# Patient Record
Sex: FEMALE | Employment: FULL TIME | URBAN - NONMETROPOLITAN AREA
[De-identification: names, ages, dates, MRNs, and addresses within clinical notes are randomized per-mention and may not be internally consistent; named-entity substitution may affect disease eponyms.]

---

## 2017-05-29 ENCOUNTER — OFFICE VISIT (OUTPATIENT)
Dept: URGENT CARE | Facility: PHYSICIAN GROUP | Age: 27
End: 2017-05-29
Payer: COMMERCIAL

## 2017-05-29 VITALS
BODY MASS INDEX: 24.9 KG/M2 | HEART RATE: 80 BPM | DIASTOLIC BLOOD PRESSURE: 84 MMHG | OXYGEN SATURATION: 96 % | TEMPERATURE: 97.6 F | WEIGHT: 159 LBS | RESPIRATION RATE: 14 BRPM | SYSTOLIC BLOOD PRESSURE: 122 MMHG

## 2017-05-29 DIAGNOSIS — J01.00 ACUTE MAXILLARY SINUSITIS, RECURRENCE NOT SPECIFIED: ICD-10-CM

## 2017-05-29 DIAGNOSIS — H92.01 ACUTE OTALGIA, RIGHT: ICD-10-CM

## 2017-05-29 PROCEDURE — 99214 OFFICE O/P EST MOD 30 MIN: CPT | Performed by: NURSE PRACTITIONER

## 2017-05-29 RX ORDER — FLUTICASONE PROPIONATE 50 MCG
1 SPRAY, SUSPENSION (ML) NASAL 2 TIMES DAILY
Qty: 16 G | Refills: 0 | Status: SHIPPED | OUTPATIENT
Start: 2017-05-29 | End: 2017-09-02

## 2017-05-29 RX ORDER — AZITHROMYCIN 250 MG/1
TABLET, FILM COATED ORAL
Qty: 6 TAB | Refills: 0 | Status: SHIPPED | OUTPATIENT
Start: 2017-05-29 | End: 2017-09-02

## 2017-05-29 NOTE — MR AVS SNAPSHOT
Nette Sinha   2017 10:10 AM   Office Visit   MRN: [id number]    Department:  [facility]   Dept Phone:  [phone]    Description:  Female : 1990   Provider:  CARMINA Green           Reason for Visit     Otalgia           Allergies as of 2017     Allergen Noted Reactions    Penicillins 2014   Swelling      You were diagnosed with     Acute maxillary sinusitis, recurrence not specified   [[id number]]       Acute otalgia, right   [[id number]]         Vital Signs     Blood Pressure Pulse Temperature Respirations Weight Oxygen Saturation    122/84 mmHg 80 36.4 °C (97.6 °F) 14 72.122 kg (159 lb) 96%    Smoking Status                   Never Smoker            Basic Information     Date Of Birth Sex Race Ethnicity Preferred Language    1990 Female Unknown Unknown English      Health Maintenance        Date Due Completion Dates    IMM HEP B VACCINE (1 of 3 - Primary Series) 1989 ---    IMM HEP A VACCINE (1 of 2 - Standard Series) 1990 ---    IMM VARICELLA (CHICKENPOX) VACCINE (1 of 2 - 2 Dose Adolescent Series) 2002 ---    IMM DTaP/Tdap/Td Vaccine (1 - Tdap) 2008 ---    PAP SMEAR 2010 ---            Current Immunizations     Tuberculin Skin Test 3/12/2012 12:26 PM, 3/5/2012 11:50 AM      Below and/or attached are the medications your provider expects you to take. Review all of your home medications and newly ordered medications with your provider and/or pharmacist. Follow medication instructions as directed by your provider and/or pharmacist. Please keep your medication list with you and share with your provider. Update the information when medications are discontinued, doses are changed, or new medications (including over-the-counter products) are added; and carry medication information at all times in the event of emergency situations     Allergies:  PENICILLINS - Swelling               Medications  Valid as of: May 29, 2017 - 10:49 AM    Generic  Name Brand Name Tablet Size Instructions for use    Azithromycin (Tab) ZITHROMAX 250 MG Take two tabs on day one followed by one tab on days 2-5.        Fluticasone Propionate (Suspension) FLONASE 50 MCG/ACT Spray 1 Spray in nose 2 times a day.        .                 Medicines prescribed today were sent to:     [id number] - [place], [place] - [address] [facility] [address] [facility] [address]    Phone: [phone] Fax: [phone]    Open 24 Hours?: No      Medication refill instructions:       If your prescription bottle indicates you have medication refills left, it is not necessary to call your provider’s office. Please contact your pharmacy and they will refill your medication.    If your prescription bottle indicates you do not have any refills left, you may request refills at any time through one of the following ways: The online Barkibu system (except Urgent Care), by calling your provider’s office, or by asking your pharmacy to contact your provider’s office with a refill request. Medication refills are processed only during regular business hours and may not be available until the next business day. Your provider may request additional information or to have a follow-up visit with you prior to refilling your medication.   *Please Note: Medication refills are assigned a new Rx number when refilled electronically. Your pharmacy may indicate that no refills were authorized even though a new prescription for the same medication is available at the pharmacy. Please request the medicine by name with the pharmacy before contacting your provider for a refill.           Barkibu Access Code: [email or web address]  Expires: 6/28/2017 10:49 AM    Your email address is not on file at [facility].  Email Addresses are required for you to sign up for Barkibu, please contact [phone] to verify your personal information and to provide your email address prior to attempting to  register for [employer].    [facility]  [address] [place] [address]    Recombinehart  A secure, online tool to manage your health information     [employer]® is a secure, online tool that connects you to your personalized health information from the privacy of your home -- day or night - making it very easy for you to manage your healthcare. Once the activation process is completed, you can even access your medical information using the [employer] refugio, which is available for free in the Apple Refugio store or Google Play store.     To learn more about [employer], visit [email or web address]    There are two levels of access available (as shown below):   My Chart Features  [facility] Primary Care Doctor [facility]  Specialists [facility]  Urgent  Care Non-[facility] Primary Care Doctor   Email your healthcare team securely and privately 24/7 X X X    Manage appointments: schedule your next appointment; view details of past/upcoming appointments X      Request prescription refills. X      View recent personal medical records, including lab and immunizations X X X X   View health record, including health history, allergies, medications X X X X   Read reports about your outpatient visits, procedures, consult and ER notes X X X X   See your discharge summary, which is a recap of your hospital and/or ER visit that includes your diagnosis, lab results, and care plan X X  X     How to register for [employer]:  Once your e-mail address has been verified, follow the following steps to sign up for [employer].     1. Go to  [email or web address]  2. Click on the Sign Up Now box, which takes you to the New Member Sign Up page. You will need to provide the following information:  a. Enter your [employer] Access Code exactly as it appears at the top of this page. (You will not need to use this code after you’ve completed the sign-up process. If you do not sign up before the expiration date, you must request a new code.)   b. Enter your date of  birth.   c. Enter your home email address.   d. Click Submit, and follow the next screen’s instructions.  3. Create a [employer] ID. This will be your [employer] login ID and cannot be changed, so think of one that is secure and easy to remember.  4. Create a Slanissuet password. You can change your password at any time.  5. Enter your Password Reset Question and Answer. This can be used at a later time if you forget your password.   6. Enter your e-mail address. This allows you to receive e-mail notifications when new information is available in [employer].  7. Click Sign Up. You can now view your health information.    For assistance activating your [employer] account, call [phone]

## 2017-05-29 NOTE — PROGRESS NOTES
Chief Complaint   Patient presents with   • Otalgia       HISTORY OF PRESENT ILLNESS: Patient is a 26 y.o. female who presents today due complaints of right ear pain for the past two days. She admits to one week of associated nasal congestion and sinus pressure. She denies associated cough, difficulty breathing, confusion, nausea, vomiting or diarrhea. She has tried OTC cold/sinus medication at home without much improvement. She admits to a history of seasonal allergies in the past and feels her symptoms may be related. No known ill contacts at home. No recent antibiotic usage.     There are no active problems to display for this patient.      Allergies:Penicillins    Current Outpatient Prescriptions Ordered in [facility]   Medication Sig Dispense Refill   • azithromycin (ZITHROMAX) 250 MG Tab Take two tabs on day one followed by one tab on days 2-5. 6 Tab 0   • fluticasone (FLONASE) 50 MCG/ACT nasal spray Spray 1 Spray in nose 2 times a day. 16 g 0     No current Epic-ordered facility-administered medications on file.       History reviewed. No pertinent past medical history.    Social History   Substance Use Topics   • Smoking status: Never Smoker    • Smokeless tobacco: Never Used   • Alcohol Use: Yes       No family status information on file.   History reviewed. No pertinent family history.    ROS:  Review of Systems   Constitutional: Negative for fever, chills, fatigue.   HENT: Positive for right ear pain, maxillary sinus pressure, nasal congestion. Negative for sore throat, nosebleeds, neck pain.    Eyes: Negative for vision changes.   Cardiovascular: Negative for chest pain, palpitations, orthopnea and leg swelling.   Respiratory: Negative for cough, sputum production, shortness of breath and wheezing.   Gastrointestinal: Negative for abdominal pain, nausea, vomiting or diarrhea.    Skin: Negative for rash, diaphoresis.     Exam:  Blood pressure 122/84, pulse 80, temperature 36.4 °C (97.6 °F), resp. rate 14,  weight 72.122 kg (159 lb), SpO2 96 %.  General: well-nourished, well-developed female in NAD  Head: normocephalic, atraumatic  Eyes: PERRLA, no conjunctival injection, acuity grossly intact, lids normal.  Ears: normal shape and symmetry, no tenderness, no discharge. External canals are without any significant edema or erythema. Tympanic membranes are without any inflammation, no effusion. Gross auditory acuity is intact.  Nose: symmetrical without tenderness, erythema and swelling noted bilateral turbinates, clear discharge. Mild bilateral sinus tenderness.   Mouth/Throat: reasonable hygiene, no exudates or tonsillar enlargement. Erythema is present.   Neck: no masses, range of motion within normal limits, no tracheal deviation. No obvious thyroid enlargement.   Lymph: no cervical adenopathy. No supraclavicular adenopathy.   Neuro: alert and oriented. Cranial nerves 1-12 grossly intact. No sensory deficit.   Cardiovascular: regular rate and rhythm. No edema.  Pulmonary: no distress. Chest is symmetrical with respiration, no wheezes, crackles, or rhonchi.   Musculoskeletal: no clubbing, appropriate muscle tone, gait is stable.  Skin: warm, dry, intact, no clubbing, no cyanosis, no rashes.   Psych: appropriate mood, affect, judgement.         Assessment/Plan:  1. Acute maxillary sinusitis, recurrence not specified  azithromycin (ZITHROMAX) 250 MG Tab    fluticasone (FLONASE) 50 MCG/ACT nasal spray   2. Acute otalgia, right  fluticasone (FLONASE) 50 MCG/ACT nasal spray         Otalgia most likely related to sinusitis, supportive care discussed.   Flonase as directed. Nasal washes with sterile saline solution daily. Sleep with HOB elevated, humidifier at night, rest, increase fluid intake.   Contingent antibiotic prescription given to patient to fill upon meeting criteria of guidelines discussed.   Differential diagnoses, and indications for immediate follow-up discussed with patient.   Pathogenesis of diagnosis  discussed including typical length and natural progression.   Instructed to return to clinic or nearest emergency department for any change in condition, further concerns, or worsening of symptoms.  Patient states understanding of the plan of care and discharge instructions.  Instructed to make an appointment, for follow up, with their primary care provider.        Please note that this dictation was created using voice recognition software. I have made every reasonable attempt to correct obvious errors, but I expect that there are errors of grammar and possibly content that I did not discover before finalizing the note.      SHANE Quiroz.

## 2017-09-02 ENCOUNTER — OFFICE VISIT (OUTPATIENT)
Dept: URGENT CARE | Facility: PHYSICIAN GROUP | Age: 27
End: 2017-09-02
Payer: COMMERCIAL

## 2017-09-02 VITALS
TEMPERATURE: 97.8 F | RESPIRATION RATE: 16 BRPM | HEART RATE: 75 BPM | DIASTOLIC BLOOD PRESSURE: 90 MMHG | WEIGHT: 158 LBS | OXYGEN SATURATION: 96 % | SYSTOLIC BLOOD PRESSURE: 122 MMHG | BODY MASS INDEX: 24.8 KG/M2 | HEIGHT: 67 IN

## 2017-09-02 DIAGNOSIS — L01.00 IMPETIGO: ICD-10-CM

## 2017-09-02 PROCEDURE — 99214 OFFICE O/P EST MOD 30 MIN: CPT | Performed by: NURSE PRACTITIONER

## 2017-09-02 RX ORDER — MUPIROCIN CALCIUM 20 MG/G
1 CREAM TOPICAL 2 TIMES DAILY
Qty: 1 TUBE | Refills: 0 | Status: SHIPPED | OUTPATIENT
Start: 2017-09-02 | End: 2017-09-12

## 2017-09-02 NOTE — PATIENT INSTRUCTIONS
Impetigo, Adult  Impetigo is an infection of the skin. It commonly occurs in young children, but it can also occur in adults. The infection causes itchy blisters and sores that produce brownish-yellow fluid. As the fluid dries, it forms a thick, honey-colored crust. These skin changes usually occur on the face but can also affect other areas of the body. Impetigo usually goes away in 7-10 days with treatment.  CAUSES  Impetigo is caused by two types of bacteria. It may be caused by staphylococci or streptococci bacteria. These bacteria cause impetigo when they get under the surface of the skin. This often happens after some damage to the skin, such as damage from:  · Cuts, scrapes, or scratches.  · Insect bites, especially when you scratch the area of a bite.  · Chickenpox or other illnesses that cause open skin sores.  · Nail biting or chewing.  Impetigo is contagious and can spread easily from one person to another. This may occur through close skin contact or by sharing towels, clothing, or other items with a person who has the infection.  RISK FACTORS  Some things that can increase the risk of getting this infection include:  · Playing sports that include skin-to-skin contact with others.  · Having a skin condition with open sores.  · Having many skin cuts or scrapes.  · Living in an area that has high humidity levels.  · Having poor hygiene.  · Having high levels of staphylococci in your nose.  SIGNS AND SYMPTOMS  Impetigo usually starts out as small blisters, often on the face. The blisters then break open and turn into tiny sores (lesions) with a yellow crust. In some cases, the blisters cause itching or burning. With scratching, irritation, or lack of treatment, these small lesions may get larger. Scratching can also cause impetigo to spread to other parts of the body. The bacteria can get under the fingernails and spread when you touch another area of your skin.  Other possible symptoms include:  · Larger  blisters.  · Pus.  · Swollen lymph glands.  DIAGNOSIS  This condition is usually diagnosed during a physical exam. A skin sample or sample of fluid from a blister may be taken for lab tests that involve growing bacteria (culture test). This can help confirm the diagnosis or help determine the best treatment.  TREATMENT  Mild impetigo can be treated with prescription antibiotic cream. Oral antibiotic medicine may be used in more severe cases. Medicines for itching may also be used.  HOME CARE INSTRUCTIONS  · Take medicines only as directed by your health care provider.  · To help prevent impetigo from spreading to other body areas:  ¨ Keep your fingernails short and clean.  ¨ Do not scratch the blisters or sores.  ¨ Cover infected areas, if necessary, to keep from scratching.  · Gently wash the infected areas with antibiotic soap and water.  · Soak crusted areas in warm, soapy water using antibiotic soap.  ¨ Gently rub the areas to remove crusts. Do not scrub.  · Wash your hands often to avoid spreading this infection.  · Stay home until you have used an antibiotic cream for 48 hours (2 days) or an oral antibiotic medicine for 24 hours (1 day). You should only return to work and activities with other people if your skin shows significant improvement.  PREVENTION   To keep the infection from spreading:  · Stay home until you have used an antibiotic cream for 48 hours or an oral antibiotic for 24 hours.  · Wash your hands often.  · Do not engage in skin-to-skin contact with other people while you have still have blisters.  · Do not share towels, washcloths, or bedding with others while you have the infection.  SEEK MEDICAL CARE IF:  · You develop more blisters or sores despite treatment.  · Other family members get sores.  · Your skin sores are not improving after 48 hours of treatment.  · You have a fever.  SEEK IMMEDIATE MEDICAL CARE IF:  · You see spreading redness or swelling of the skin around your sores.  · You  see red streaks coming from your sores.  · You develop a sore throat.     This information is not intended to replace advice given to you by your health care provider. Make sure you discuss any questions you have with your health care provider.     Document Released: 01/08/2016 Document Reviewed: 01/08/2016  [employer] Interactive Patient Education ©2016 [employer]    Your medical care was provided today by: MOHINDER Pierre    Thank You for the opportunity to serve you.    You may receive a brief survey in the mail shortly regarding your visit today. Please take a few moments to complete the survey and return it; no postage is necessary. We are working to serve our patient population better, improve customer service and our patients overall experience and your input can help us to accomplish this. We thank you for your help and for the opportunity to serve you today and in the future.     Special Instructions:  Always call 9-1-1 immediately if you develop a life threatening emergency.    Unless told otherwise please take all medications as directed and complete prescription therapies.     Watch for the following signs that require additional evaluation: progressive lethargy or unresponsiveness, localized pain (chest, abdomen), shortness of breath, painful breathing, progressive vomiting with weakness, bloody stools, or new rash.     If you are prescribed pain medication or any other medication that is sedating, do not take medication before or while operating a vehicle or heavy machinery or equipment due to potential side effects such as drowsiness and/or dizziness.

## 2017-09-02 NOTE — PROGRESS NOTES
Subjective:     Nette Sinha is a 27 y.o. female here today for new onset rash/sores on her nose.     No problem-specific Assessment & Plan notes found for this encounter.     This is a new problem.   Duration of rash 1-2 days, on nose     Description of rash:    Location: nose, nasal        Symptoms: crusted sores    Is it spreading: minimally, only on nose     Associated Symptoms:   Fatigue: No    Joint or Body Aches: No    Shortness of Breath:No    Wheezing:  No     Pt has a similar rash in the past. She does report a history of impetigo previously and also cold sores. She has not been picking at sores, however, they appears as though she has.   Pt suspects the following as a possible cause of rash: Patient reports her children recently contracted HFM.   New Medications: none  Treatment prior to visit: hydrogen peroxide   Occupation: lab tech     Current medicines (including changes today)  Current Outpatient Prescriptions   Medication Sig Dispense Refill   • mupirocin calcium (BACTROBAN) 2 % Cream Apply 1 Application to affected area(s) 2 times a day for 10 days. 1 Tube 0     No current facility-administered medications for this visit.        She  has a past medical history of Allergy.    She  has a past surgical history that includes dental extraction(s).    Social History     Social History   • Marital status: Single     Spouse name: N/A   • Number of children: N/A   • Years of education: N/A     Social History Main Topics   • Smoking status: Never Smoker   • Smokeless tobacco: Never Used   • Alcohol use Yes      Comment: rare   • Drug use: No   • Sexual activity: Not on file     Other Topics Concern   • Not on file     Social History Narrative   • No narrative on file       Family History   Problem Relation Age of Onset   • Diabetes Mother      Type 1   • Hypertension Father          ROS  Positive for rash on nose.   No fever, No URI symptoms,  no chest pain, no shortness of breath, no abdominal pain.  "    All other systems reviewed and are negative.        Objective:     Blood pressure 122/90, pulse 75, temperature 36.6 °C (97.8 °F), resp. rate 16, height 1.702 m (5' 7\"), weight 71.7 kg (158 lb), SpO2 96 %. Body mass index is 24.75 kg/m².    Physical Exam:   Constitutional: Alert, no distress. Non-toxic appearing.   Eye: Equal, round and reactive, conjunctiva clear, lids normal.   ENMT: Lips without lesions, good dentition, oropharynx clear. TMs normal, without erythema, canals patent. There are two crusted lesions on nose near nasal cavity.  No nasal drainage noted.   Neck: Trachea midline, no masses. No cervical or supraclavicular lymphadenopathy  Respiratory: Unlabored respiratory effort, lungs clear to auscultation, no wheezes, no ronchi.  Cardiovascular: Normal S1, S2, no murmur, no edema.   Psych: Alert and oriented x3, normal affect and mood.      Assessment and Plan:   The following treatment plan was discussed    1. Impetigo  Does have mild appearance of impetigo. Exam otherwise normal, not entirely suspicious of HFM. Per patient, she has a history of cold sores, however, did not feel any tingling previously. Treatment with Bactroban cream. Advised good hand washing, mask while at work. Discussed s/s to seek emergent care. RTC if symptoms worsen or do not resolve.     Reviewed indication, dosage, usage and potential adverse effects of prescribed medications. Patient appears to understand, verbalizes understanding and is willing to try medications as prescribed.      Reviewed risks and benefits of treatment plan. Patient verbally agrees to plan of care.       Followup: Return if symptoms worsen or fail to improve.    SHANE Tyler.     PLEASE NOTE: This dictation was created using voice recognition software. I have made every reasonable attempt to correct obvious errors, but I expect that there may be errors of grammar and possibly content that I did not discover prior finalizing this note. "

## 2017-09-12 ENCOUNTER — RX ONLY (OUTPATIENT)
Age: 27
Setting detail: RX ONLY
End: 2017-09-12

## 2018-05-15 ENCOUNTER — OFFICE VISIT (OUTPATIENT)
Dept: URGENT CARE | Facility: PHYSICIAN GROUP | Age: 28
End: 2018-05-15

## 2018-05-15 VITALS
OXYGEN SATURATION: 96 % | DIASTOLIC BLOOD PRESSURE: 72 MMHG | BODY MASS INDEX: 24.8 KG/M2 | HEART RATE: 106 BPM | TEMPERATURE: 97.5 F | WEIGHT: 158 LBS | SYSTOLIC BLOOD PRESSURE: 120 MMHG | HEIGHT: 67 IN

## 2018-05-15 DIAGNOSIS — J02.9 SORE THROAT: ICD-10-CM

## 2018-05-15 DIAGNOSIS — J02.0 STREP PHARYNGITIS: Primary | ICD-10-CM

## 2018-05-15 LAB
INT CON NEG: NORMAL
INT CON POS: NORMAL
S PYO AG THROAT QL: NORMAL

## 2018-05-15 PROCEDURE — 99214 OFFICE O/P EST MOD 30 MIN: CPT | Performed by: PHYSICIAN ASSISTANT

## 2018-05-15 PROCEDURE — 87880 STREP A ASSAY W/OPTIC: CPT | Performed by: PHYSICIAN ASSISTANT

## 2018-05-15 RX ORDER — AZITHROMYCIN 250 MG/1
TABLET, FILM COATED ORAL
Qty: 6 TAB | Refills: 0 | Status: SHIPPED | OUTPATIENT
Start: 2018-05-15 | End: 2018-12-23

## 2018-05-15 NOTE — PROGRESS NOTES
"Chief Complaint   Patient presents with   • Pharyngitis     Sore throat/daughter had strep x2 weeks ago        HISTORY OF PRESENT ILLNESS: Patient is a 27 y.o. female who presents today because she has a 2-3 day history of worsening sore throat. Reports fevers, chills, no nausea, vomiting or diarrhea. She has been taking over-the-counter anti-inflammatories for her symptoms without any significant improvement.    There are no active problems to display for this patient.      Allergies:Penicillins    Current Outpatient Prescriptions Ordered in [facility]   Medication Sig Dispense Refill   • azithromycin (ZITHROMAX) 250 MG Tab Follow package directions 6 Tab 0     No current Epic-ordered facility-administered medications on file.        Past Medical History:   Diagnosis Date   • Allergy        Social History   Substance Use Topics   • Smoking status: Never Smoker   • Smokeless tobacco: Never Used   • Alcohol use Yes      Comment: rare       Family Status   Relation Status   • Mother Alive   • Father Alive     Family History   Problem Relation Age of Onset   • Diabetes Mother      Type 1   • Hypertension Father        ROS:  Review of Systems   Constitutional: Positive for fever, chills, no weight loss and malaise/fatigue.   HENT: Negative for ear pain, nosebleeds, congestion, positive for sore throat and neck pain.    Eyes: Negative for blurred vision.   Respiratory: Negative for cough, sputum production, shortness of breath and wheezing.    Cardiovascular: Negative for chest pain, palpitations, orthopnea and leg swelling.   Gastrointestinal: Negative for heartburn, nausea, vomiting and abdominal pain.   Genitourinary: Negative for dysuria, urgency and frequency.     Exam:  Blood pressure 120/72, pulse (!) 106, temperature 36.4 °C (97.5 °F), height 1.702 m (5' 7\"), weight 71.7 kg (158 lb), SpO2 96 %.  General:  Well nourished, well developed female in NAD  Head:Normocephalic, atraumatic  Eyes: PERRLA, EOM within normal " limits, no conjunctival injection, no scleral icterus, visual fields and acuity grossly intact.  Ears: Normal shape and symmetry, no tenderness, no discharge. External canals are without any significant edema or erythema. Tympanic membranes are without any inflammation, no effusion. Gross auditory acuity is intact  Nose: Symmetrical without tenderness, no discharge.  Mouth: reasonable hygiene, she has pharyngeal arch erythema without exudates or tonsillar enlargement.  Neck: There is bilateral anterior cervical lymph node enlargement and tenderness, range of motion within normal limits, no tracheal deviation. No obvious thyroid enlargement.  Pulmonary: chest is symmetrical with respiration, no wheezes, crackles, or rhonchi.  Cardiovascular: regular rate and rhythm without murmurs, rubs, or gallops.  Extremities: no clubbing, cyanosis, or edema.    Strep test is positive    Please note that this dictation was created using voice recognition software. I have made every reasonable attempt to correct obvious errors, but I expect that there are errors of grammar and possibly content that I did not discover before finalizing the note.    Assessment/Plan:  1. Strep pharyngitis  azithromycin (ZITHROMAX) 250 MG Tab   2. Sore throat  POCT Rapid Strep A    , Tylenol, ibuprofen, over-the-counter medications as tolerated.  Necessary    Followup with primary care in the next 7-10 days if not significantly improving, return to the urgent care or go to the emergency room sooner for any worsening of symptoms.

## 2018-12-23 ENCOUNTER — OFFICE VISIT (OUTPATIENT)
Dept: URGENT CARE | Facility: PHYSICIAN GROUP | Age: 28
End: 2018-12-23
Payer: COMMERCIAL

## 2018-12-23 VITALS
HEART RATE: 74 BPM | RESPIRATION RATE: 16 BRPM | OXYGEN SATURATION: 98 % | BODY MASS INDEX: 23.81 KG/M2 | WEIGHT: 152 LBS | TEMPERATURE: 98.4 F | DIASTOLIC BLOOD PRESSURE: 82 MMHG | SYSTOLIC BLOOD PRESSURE: 116 MMHG

## 2018-12-23 DIAGNOSIS — J02.9 PHARYNGITIS, UNSPECIFIED ETIOLOGY: ICD-10-CM

## 2018-12-23 DIAGNOSIS — Z20.818 EXPOSURE TO STREP THROAT: ICD-10-CM

## 2018-12-23 LAB
INT CON NEG: NORMAL
INT CON POS: NORMAL
S PYO AG THROAT QL: NEGATIVE

## 2018-12-23 PROCEDURE — 99214 OFFICE O/P EST MOD 30 MIN: CPT | Performed by: NURSE PRACTITIONER

## 2018-12-23 PROCEDURE — 87880 STREP A ASSAY W/OPTIC: CPT | Performed by: NURSE PRACTITIONER

## 2018-12-23 RX ORDER — AZITHROMYCIN 250 MG/1
TABLET, FILM COATED ORAL
Qty: 6 TAB | Refills: 0 | Status: SHIPPED | OUTPATIENT
Start: 2018-12-23 | End: 2023-02-21

## 2018-12-23 ASSESSMENT — ENCOUNTER SYMPTOMS
MYALGIAS: 0
SHORTNESS OF BREATH: 0
DIZZINESS: 0
SWOLLEN GLANDS: 1
SORE THROAT: 1
FEVER: 1
NAUSEA: 0
HEADACHES: 0
NECK PAIN: 0
TROUBLE SWALLOWING: 0
CHILLS: 1
EYE PAIN: 0
VOMITING: 0
COUGH: 0

## 2018-12-23 NOTE — PROGRESS NOTES
Subjective:   Nette Sinha is a 28 y.o. female who presents for Sore Throat (x1d with low grade fever)        Pharyngitis    This is a new problem. The current episode started yesterday. The problem has been unchanged. Neither side of throat is experiencing more pain than the other. The maximum temperature recorded prior to her arrival was 100.4 - 100.9 F. The fever has been present for less than 1 day. The pain is at a severity of 8/10. The pain is moderate. Associated symptoms include swollen glands. Pertinent negatives include no congestion, coughing, ear discharge, ear pain, headaches, neck pain, shortness of breath, trouble swallowing or vomiting. She has had no exposure to strep. She has tried acetaminophen for the symptoms. The treatment provided no relief.     Review of Systems   Constitutional: Positive for chills and fever.   HENT: Positive for sore throat. Negative for congestion, ear discharge, ear pain and trouble swallowing.    Eyes: Negative for pain.   Respiratory: Negative for cough and shortness of breath.    Cardiovascular: Negative for chest pain.   Gastrointestinal: Negative for nausea and vomiting.   Genitourinary: Negative for hematuria.   Musculoskeletal: Negative for myalgias and neck pain.   Skin: Negative for rash.   Neurological: Negative for dizziness and headaches.     Allergies   Allergen Reactions   • Penicillins Swelling      Objective:   /82   Pulse 74   Temp 36.9 °C (98.4 °F) (Temporal)   Resp 16   Wt 68.9 kg (152 lb)   SpO2 98%   BMI 23.81 kg/m²   Physical Exam   Constitutional: She is oriented to person, place, and time. She appears well-developed and well-nourished. No distress.   HENT:   Head: Normocephalic and atraumatic.   Right Ear: Tympanic membrane normal.   Left Ear: Tympanic membrane normal.   Nose: Nose normal. Right sinus exhibits no maxillary sinus tenderness and no frontal sinus tenderness. Left sinus exhibits no maxillary sinus tenderness and no  frontal sinus tenderness.   Mouth/Throat: Uvula is midline and mucous membranes are normal. No trismus in the jaw. No uvula swelling. Posterior oropharyngeal edema and posterior oropharyngeal erythema present. No tonsillar abscesses. No tonsillar exudate.   Eyes: Pupils are equal, round, and reactive to light. Conjunctivae and EOM are normal. Right eye exhibits no discharge. Left eye exhibits no discharge.   Cardiovascular: Normal rate and regular rhythm.    No murmur heard.  Pulmonary/Chest: Effort normal and breath sounds normal. No respiratory distress.   Abdominal: Soft. She exhibits no distension. There is no tenderness.   Lymphadenopathy:     She has no cervical adenopathy.   Neurological: She is alert and oriented to person, place, and time. She has normal reflexes. No sensory deficit.   Skin: Skin is warm, dry and intact.   Psychiatric: She has a normal mood and affect.         Assessment/Plan:     1. Pharyngitis, unspecified etiology  POCT Rapid Strep A    azithromycin (ZITHROMAX) 250 MG Tab   2. Exposure to strep throat  azithromycin (ZITHROMAX) 250 MG Tab     Strep negative  Patient's daughter who is in exam room with patient positive for strep.    Advised to continue supportive care with Tylenol and/or ibuprofen for fevers and discomfort. Increased fluids and electrolytes.  Patient given precautionary s/sx that mandate immediate follow up and evaluation in the ED. Advised of risks of not doing so.    DDX, Supportive care, and indications for immediate follow-up discussed with patient.    Instructed to return to clinic or nearest emergency department if we are not available for any change in condition, further concerns, or worsening of symptoms.    The patient demonstrated a good understanding and agreed with the treatment plan.

## 2019-03-27 ENCOUNTER — APPOINTMENT (RX ONLY)
Dept: URBAN - NONMETROPOLITAN AREA CLINIC 15 | Facility: CLINIC | Age: 29
Setting detail: DERMATOLOGY
End: 2019-03-27

## 2019-03-27 DIAGNOSIS — L70.0 ACNE VULGARIS: ICD-10-CM

## 2019-03-27 DIAGNOSIS — B00.1 HERPESVIRAL VESICULAR DERMATITIS: ICD-10-CM

## 2019-03-27 PROCEDURE — ? PATIENT SPECIFIC COUNSELING

## 2019-03-27 PROCEDURE — ? COUNSELING

## 2019-03-27 PROCEDURE — ? PRESCRIPTION

## 2019-03-27 PROCEDURE — ? MEDICATION COUNSELING

## 2019-03-27 PROCEDURE — 99213 OFFICE O/P EST LOW 20 MIN: CPT

## 2019-03-27 RX ORDER — TRETINOIN 0.25 MG/G
CREAM TOPICAL QHS
Qty: 1 | Refills: 12 | Status: ERX | COMMUNITY
Start: 2019-03-27

## 2019-03-27 RX ORDER — DAPSONE 75 MG/G
GEL TOPICAL
Qty: 1 | Refills: 12 | Status: ERX | COMMUNITY
Start: 2019-03-27

## 2019-03-27 RX ORDER — VALACYCLOVIR HYDROCHLORIDE 1 G/1
TABLET, FILM COATED ORAL Q12 HOURS
Qty: 30 | Refills: 12 | Status: ERX | COMMUNITY
Start: 2019-03-27

## 2019-03-27 RX ADMIN — TRETINOIN: 0.25 CREAM TOPICAL at 15:50

## 2019-03-27 RX ADMIN — DAPSONE: 75 GEL TOPICAL at 15:49

## 2019-03-27 RX ADMIN — VALACYCLOVIR HYDROCHLORIDE: 1 TABLET, FILM COATED ORAL at 15:52

## 2019-03-27 ASSESSMENT — LOCATION SIMPLE DESCRIPTION DERM
LOCATION SIMPLE: RIGHT LIP
LOCATION SIMPLE: INFERIOR FOREHEAD

## 2019-03-27 ASSESSMENT — LOCATION DETAILED DESCRIPTION DERM
LOCATION DETAILED: RIGHT SUPERIOR VERMILION LIP
LOCATION DETAILED: INFERIOR MID FOREHEAD

## 2019-03-27 ASSESSMENT — LOCATION ZONE DERM
LOCATION ZONE: FACE
LOCATION ZONE: LIP

## 2019-03-27 NOTE — PROCEDURE: COUNSELING
Topical Retinoid Pregnancy And Lactation Text: This medication is Pregnancy Category C. It is unknown if this medication is excreted in breast milk.
Erythromycin Pregnancy And Lactation Text: This medication is Pregnancy Category B and is considered safe during pregnancy. It is also excreted in breast milk.
Dapsone Counseling: I discussed with the patient the risks of dapsone including but not limited to hemolytic anemia, agranulocytosis, rashes, methemoglobinemia, kidney failure, peripheral neuropathy, headaches, GI upset, and liver toxicity.  Patients who start dapsone require monitoring including baseline LFTs and weekly CBCs for the first month, then every month thereafter.  The patient verbalized understanding of the proper use and possible adverse effects of dapsone.  All of the patient's questions and concerns were addressed.
Minocycline Counseling: Patient advised regarding possible photosensitivity and discoloration of the teeth, skin, lips, tongue and gums.  Patient instructed to avoid sunlight, if possible.  When exposed to sunlight, patients should wear protective clothing, sunglasses, and sunscreen.  The patient was instructed to call the office immediately if the following severe adverse effects occur:  hearing changes, easy bruising/bleeding, severe headache, or vision changes.  The patient verbalized understanding of the proper use and possible adverse effects of minocycline.  All of the patient's questions and concerns were addressed.
Topical Sulfur Applications Counseling: Topical Sulfur Counseling: Patient counseled that this medication may cause skin irritation or allergic reactions.  In the event of skin irritation, the patient was advised to reduce the amount of the drug applied or use it less frequently.   The patient verbalized understanding of the proper use and possible adverse effects of topical sulfur application.  All of the patient's questions and concerns were addressed.
Detail Level: Zone
Tetracycline Counseling: Patient counseled regarding possible photosensitivity and increased risk for sunburn.  Patient instructed to avoid sunlight, if possible.  When exposed to sunlight, patients should wear protective clothing, sunglasses, and sunscreen.  The patient was instructed to call the office immediately if the following severe adverse effects occur:  hearing changes, easy bruising/bleeding, severe headache, or vision changes.  The patient verbalized understanding of the proper use and possible adverse effects of tetracycline.  All of the patient's questions and concerns were addressed. Patient understands to avoid pregnancy while on therapy due to potential birth defects.
High Dose Vitamin A Pregnancy And Lactation Text: High dose vitamin A therapy is contraindicated during pregnancy and breast feeding.
Azithromycin Pregnancy And Lactation Text: This medication is considered safe during pregnancy and is also secreted in breast milk.
Minocycline Pregnancy And Lactation Text: This medication is Pregnancy Category D and not consider safe during pregnancy. It is also excreted in breast milk.
Topical Sulfur Applications Pregnancy And Lactation Text: This medication is Pregnancy Category C and has an unknown safety profile during pregnancy. It is unknown if this topical medication is excreted in breast milk.
Isotretinoin Counseling: Patient should get monthly blood tests, not donate blood, not drive at night if vision affected, not share medication, and not undergo elective surgery for 6 months after tx completed. Side effects reviewed, pt to contact office should one occur.
Tazorac Counseling:  Patient advised that medication is irritating and drying.  Patient may need to apply sparingly and wash off after an hour before eventually leaving it on overnight.  The patient verbalized understanding of the proper use and possible adverse effects of tazorac.  All of the patient's questions and concerns were addressed.
Bactrim Counseling:  I discussed with the patient the risks of sulfa antibiotics including but not limited to GI upset, allergic reaction, drug rash, diarrhea, dizziness, photosensitivity, and yeast infections.  Rarely, more serious reactions can occur including but not limited to aplastic anemia, agranulocytosis, methemoglobinemia, blood dyscrasias, liver or kidney failure, lung infiltrates or desquamative/blistering drug rashes.
Dapsone Pregnancy And Lactation Text: This medication is Pregnancy Category C and is not considered safe during pregnancy or breast feeding.
Birth Control Pills Counseling: Birth Control Pill Counseling: I discussed with the patient the potential side effects of OCPs including but not limited to increased risk of stroke, heart attack, thrombophlebitis, deep venous thrombosis, hepatic adenomas, breast changes, GI upset, headaches, and depression.  The patient verbalized understanding of the proper use and possible adverse effects of OCPs. All of the patient's questions and concerns were addressed.
Doxycycline Counseling:  Patient counseled regarding possible photosensitivity and increased risk for sunburn.  Patient instructed to avoid sunlight, if possible.  When exposed to sunlight, patients should wear protective clothing, sunglasses, and sunscreen.  The patient was instructed to call the office immediately if the following severe adverse effects occur:  hearing changes, easy bruising/bleeding, severe headache, or vision changes.  The patient verbalized understanding of the proper use and possible adverse effects of doxycycline.  All of the patient's questions and concerns were addressed.
Benzoyl Peroxide Counseling: Patient counseled that medicine may cause skin irritation and bleach clothing.  In the event of skin irritation, the patient was advised to reduce the amount of the drug applied or use it less frequently.   The patient verbalized understanding of the proper use and possible adverse effects of benzoyl peroxide.  All of the patient's questions and concerns were addressed.
Bactrim Pregnancy And Lactation Text: This medication is Pregnancy Category D and is known to cause fetal risk.  It is also excreted in breast milk.
Spironolactone Counseling: Patient advised regarding risks of diarrhea, abdominal pain, hyperkalemia, birth defects (for female patients), liver toxicity and renal toxicity. The patient may need blood work to monitor liver and kidney function and potassium levels while on therapy. The patient verbalized understanding of the proper use and possible adverse effects of spironolactone.  All of the patient's questions and concerns were addressed.
Isotretinoin Pregnancy And Lactation Text: This medication is Pregnancy Category X and is considered extremely dangerous during pregnancy. It is unknown if it is excreted in breast milk.
Tazorac Pregnancy And Lactation Text: This medication is not safe during pregnancy. It is unknown if this medication is excreted in breast milk.
Include Pregnancy/Lactation Warning?: No
Birth Control Pills Pregnancy And Lactation Text: This medication should be avoided if pregnant and for the first 30 days post-partum.
Azithromycin Counseling:  I discussed with the patient the risks of azithromycin including but not limited to GI upset, allergic reaction, drug rash, diarrhea, and yeast infections.
Topical Clindamycin Pregnancy And Lactation Text: This medication is Pregnancy Category B and is considered safe during pregnancy. It is unknown if it is excreted in breast milk.
Topical Retinoid counseling:  Patient advised to apply a pea-sized amount only at bedtime and wait 30 minutes after washing their face before applying.  If too drying, patient may add a non-comedogenic moisturizer. The patient verbalized understanding of the proper use and possible adverse effects of retinoids.  All of the patient's questions and concerns were addressed.
Erythromycin Counseling:  I discussed with the patient the risks of erythromycin including but not limited to GI upset, allergic reaction, drug rash, diarrhea, increase in liver enzymes, and yeast infections.
Topical Clindamycin Counseling: Patient counseled that this medication may cause skin irritation or allergic reactions.  In the event of skin irritation, the patient was advised to reduce the amount of the drug applied or use it less frequently.   The patient verbalized understanding of the proper use and possible adverse effects of clindamycin.  All of the patient's questions and concerns were addressed.
High Dose Vitamin A Counseling: Side effects reviewed, pt to contact office should one occur.
Doxycycline Pregnancy And Lactation Text: This medication is Pregnancy Category D and not consider safe during pregnancy. It is also excreted in breast milk but is considered safe for shorter treatment courses.
Benzoyl Peroxide Pregnancy And Lactation Text: This medication is Pregnancy Category C. It is unknown if benzoyl peroxide is excreted in breast milk.
Spironolactone Pregnancy And Lactation Text: This medication can cause feminization of the male fetus and should be avoided during pregnancy. The active metabolite is also found in breast milk.
Detail Level: Detailed

## 2019-03-27 NOTE — PROCEDURE: PATIENT SPECIFIC COUNSELING
Detail Level: Detailed
Advised to apply tretinoin over make up at night to help from getting too dry.
Advised pt to let us know if tretinoin is too much through regular pharmacy. We will send velton through [facility].

## 2019-03-27 NOTE — HPI: PIMPLES (ACNE)
How Severe Is Your Acne?: mild
Is This A New Presentation, Or A Follow-Up?: Acne
What Type Of Note Output Would You Prefer (Optional)?: Standard Output
Females Only: When Was Your Last Menstrual Period?: 03/06/19

## 2019-03-27 NOTE — PROCEDURE: MEDICATION COUNSELING
Minoxidil Counseling: Minoxidil is a topical medication which can increase blood flow where it is applied. It is uncertain how this medication increases hair growth. Side effects are uncommon and include stinging and allergic reactions.
Ivermectin Counseling:  Patient instructed to take medication on an empty stomach with a full glass of water.  Patient informed of potential adverse effects including but not limited to nausea, diarrhea, dizziness, itching, and swelling of the extremities or lymph nodes.  The patient verbalized understanding of the proper use and possible adverse effects of ivermectin.  All of the patient's questions and concerns were addressed.
Prednisone Pregnancy And Lactation Text: This medication is Pregnancy Category C and it isn't know if it is safe during pregnancy. This medication is excreted in breast milk.
Taltz Pregnancy And Lactation Text: The risk during pregnancy and breastfeeding is uncertain with this medication.
Fluconazole Pregnancy And Lactation Text: This medication is Pregnancy Category C and it isn't know if it is safe during pregnancy. It is also excreted in breast milk.
Erivedge Pregnancy And Lactation Text: This medication is Pregnancy Category X and is absolutely contraindicated during pregnancy. It is unknown if it is excreted in breast milk.
Drysol Pregnancy And Lactation Text: This medication is considered safe during pregnancy and breast feeding.
Cellcept Counseling:  I discussed with the patient the risks of mycophenolate mofetil including but not limited to infection/immunosuppression, GI upset, hypokalemia, hypercholesterolemia, bone marrow suppression, lymphoproliferative disorders, malignancy, GI ulceration/bleed/perforation, colitis, interstitial lung disease, kidney failure, progressive multifocal leukoencephalopathy, and birth defects.  The patient understands that monitoring is required including a baseline creatinine and regular CBC testing. In addition, patient must alert us immediately if symptoms of infection or other concerning signs are noted.
Azithromycin Pregnancy And Lactation Text: This medication is considered safe during pregnancy and is also secreted in breast milk.
High Dose Vitamin A Pregnancy And Lactation Text: High dose vitamin A therapy is contraindicated during pregnancy and breast feeding.
Rituxan Counseling:  I discussed with the patient the risks of Rituxan infusions. Side effects can include infusion reactions, severe drug rashes including mucocutaneous reactions, reactivation of latent hepatitis and other infections and rarely progressive multifocal leukoencephalopathy.  All of the patient's questions and concerns were addressed.
Arava Counseling:  Patient counseled regarding adverse effects of Arava including but not limited to nausea, vomiting, abnormalities in liver function tests. Patients may develop mouth sores, rash, diarrhea, and abnormalities in blood counts. The patient understands that monitoring is required including LFTs and blood counts.  There is a rare possibility of scarring of the liver and lung problems that can occur when taking methotrexate. Persistent nausea, loss of appetite, pale stools, dark urine, cough, and shortness of breath should be reported immediately. Patient advised to discontinue Arava treatment and consult with a physician prior to attempting conception. The patient will have to undergo a treatment to eliminate Arava from the body prior to conception.
Spironolactone Counseling: Patient advised regarding risks of diarrhea, abdominal pain, hyperkalemia, birth defects (for female patients), liver toxicity and renal toxicity. The patient may need blood work to monitor liver and kidney function and potassium levels while on therapy. The patient verbalized understanding of the proper use and possible adverse effects of spironolactone.  All of the patient's questions and concerns were addressed.
Azithromycin Counseling:  I discussed with the patient the risks of azithromycin including but not limited to GI upset, allergic reaction, drug rash, diarrhea, and yeast infections.
Birth Control Pills Pregnancy And Lactation Text: This medication should be avoided if pregnant and for the first 30 days post-partum.
Dupixent Counseling: I discussed with the patient the risks of dupilumab including but not limited to eye infection and irritation, cold sores, injection site reactions, worsening of asthma, allergic reactions and increased risk of parasitic infection.  Live vaccines should be avoided while taking dupilumab. Dupilumab will also interact with certain medications such as warfarin and cyclosporine. The patient understands that monitoring is required and they must alert us or the primary physician if symptoms of infection or other concerning signs are noted.
Nsaids Counseling: NSAID Counseling: I discussed with the patient that NSAIDs should be taken with food. Prolonged use of NSAIDs can result in the development of stomach ulcers.  Patient advised to stop taking NSAIDs if abdominal pain occurs.  The patient verbalized understanding of the proper use and possible adverse effects of NSAIDs.  All of the patient's questions and concerns were addressed.
Cephalexin Pregnancy And Lactation Text: This medication is Pregnancy Category B and considered safe during pregnancy.  It is also excreted in breast milk but can be used safely for shorter doses.
Drysol Counseling:  I discussed with the patient the risks of drysol/aluminum chloride including but not limited to skin rash, itching, irritation, burning.
Azathioprine Pregnancy And Lactation Text: This medication is Pregnancy Category D and isn't considered safe during pregnancy. It is unknown if this medication is excreted in breast milk.
Topical Retinoid Pregnancy And Lactation Text: This medication is Pregnancy Category C. It is unknown if this medication is excreted in breast milk.
Spironolactone Pregnancy And Lactation Text: This medication can cause feminization of the male fetus and should be avoided during pregnancy. The active metabolite is also found in breast milk.
Gabapentin Counseling: I discussed with the patient the risks of gabapentin including but not limited to dizziness, somnolence, fatigue and ataxia.
Rituxan Pregnancy And Lactation Text: This medication is Pregnancy Category C and it isn't know if it is safe during pregnancy. It is unknown if this medication is excreted in breast milk but similar antibodies are known to be excreted.
Ivermectin Pregnancy And Lactation Text: This medication is Pregnancy Category C and it isn't known if it is safe during pregnancy. It is also excreted in breast milk.
Quinolones Counseling:  I discussed with the patient the risks of fluoroquinolones including but not limited to GI upset, allergic reaction, drug rash, diarrhea, dizziness, photosensitivity, yeast infections, liver function test abnormalities, tendonitis/tendon rupture.
Odomzo Counseling- I discussed with the patient the risks of Odomzo including but not limited to nausea, vomiting, diarrhea, constipation, weight loss, changes in the sense of taste, decreased appetite, muscle spasms, and hair loss.  The patient verbalized understanding of the proper use and possible adverse effects of Odomzo.  All of the patient's questions and concerns were addressed.
Minoxidil Pregnancy And Lactation Text: This medication has not been assigned a Pregnancy Risk Category but animal studies failed to show danger with the topical medication. It is unknown if the medication is excreted in breast milk.
Bactrim Counseling:  I discussed with the patient the risks of sulfa antibiotics including but not limited to GI upset, allergic reaction, drug rash, diarrhea, dizziness, photosensitivity, and yeast infections.  Rarely, more serious reactions can occur including but not limited to aplastic anemia, agranulocytosis, methemoglobinemia, blood dyscrasias, liver or kidney failure, lung infiltrates or desquamative/blistering drug rashes.
Elidel Counseling: Patient may experience a mild burning sensation during topical application. Elidel is not approved in children less than 2 years of age. There have been case reports of hematologic and skin malignancies in patients using topical calcineurin inhibitors although causality is questionable.
Cimetidine Counseling:  I discussed with the patient the risks of Cimetidine including but not limited to gynecomastia, headache, diarrhea, nausea, drowsiness, arrhythmias, pancreatitis, skin rashes, psychosis, bone marrow suppression and kidney toxicity.
Nsaids Pregnancy And Lactation Text: These medications are considered safe up to 30 weeks gestation. It is excreted in breast milk.
Minocycline Pregnancy And Lactation Text: This medication is Pregnancy Category D and not consider safe during pregnancy. It is also excreted in breast milk.
Tremfya Counseling: I discussed with the patient the risks of guselkumab including but not limited to immunosuppression, serious infections, worsening of inflammatory bowel disease and drug reactions.  The patient understands that monitoring is required including a PPD at baseline and must alert us or the primary physician if symptoms of infection or other concerning signs are noted.
Griseofulvin Counseling:  I discussed with the patient the risks of griseofulvin including but not limited to photosensitivity, cytopenia, liver damage, nausea/vomiting and severe allergy.  The patient understands that this medication is best absorbed when taken with a fatty meal (e.g., ice cream or french fries).
Dupixent Pregnancy And Lactation Text: This medication likely crosses the placenta but the risk for the fetus is uncertain. This medication is excreted in breast milk.
Clindamycin Counseling: I counseled the patient regarding use of clindamycin as an antibiotic for prophylactic and/or therapeutic purposes. Clindamycin is active against numerous classes of bacteria, including skin bacteria. Side effects may include nausea, diarrhea, gastrointestinal upset, rash, hives, yeast infections, and in rare cases, colitis.
Clofazimine Counseling:  I discussed with the patient the risks of clofazimine including but not limited to skin and eye pigmentation, liver damage, nausea/vomiting, gastrointestinal bleeding and allergy.
SSKI Counseling:  I discussed with the patient the risks of SSKI including but not limited to thyroid abnormalities, metallic taste, GI upset, fever, headache, acne, arthralgias, paraesthesias, lymphadenopathy, easy bleeding, arrhythmias, and allergic reaction.
Doxepin Counseling:  Patient advised that the medication is sedating and not to drive a car after taking this medication. Patient informed of potential adverse effects including but not limited to dry mouth, urinary retention, and blurry vision.  The patient verbalized understanding of the proper use and possible adverse effects of doxepin.  All of the patient's questions and concerns were addressed.
Cyclophosphamide Counseling:  I discussed with the patient the risks of cyclophosphamide including but not limited to hair loss, hormonal abnormalities, decreased fertility, abdominal pain, diarrhea, nausea and vomiting, bone marrow suppression and infection. The patient understands that monitoring is required while taking this medication.
Doxycycline Counseling:  Patient counseled regarding possible photosensitivity and increased risk for sunburn.  Patient instructed to avoid sunlight, if possible.  When exposed to sunlight, patients should wear protective clothing, sunglasses, and sunscreen.  The patient was instructed to call the office immediately if the following severe adverse effects occur:  hearing changes, easy bruising/bleeding, severe headache, or vision changes.  The patient verbalized understanding of the proper use and possible adverse effects of doxycycline.  All of the patient's questions and concerns were addressed.
Enbrel Counseling:  I discussed with the patient the risks of etanercept including but not limited to myelosuppression, immunosuppression, autoimmune hepatitis, demyelinating diseases, lymphoma, and infections.  The patient understands that monitoring is required including a PPD at baseline and must alert us or the primary physician if symptoms of infection or other concerning signs are noted.
Griseofulvin Pregnancy And Lactation Text: This medication is Pregnancy Category X and is known to cause serious birth defects. It is unknown if this medication is excreted in breast milk but breast feeding should be avoided.
Cimetidine Pregnancy And Lactation Text: This medication is Pregnancy Category B and is considered safe during pregnancy. It is also excreted in breast milk and breast feeding isn't recommended.
Clindamycin Pregnancy And Lactation Text: This medication can be used in pregnancy if certain situations. Clindamycin is also present in breast milk.
Picato Counseling:  I discussed with the patient the risks of Picato including but not limited to erythema, scaling, itching, weeping, crusting, and pain.
Acitretin Counseling:  I discussed with the patient the risks of acitretin including but not limited to hair loss, dry lips/skin/eyes, liver damage, hyperlipidemia, depression/suicidal ideation, photosensitivity.  Serious rare side effects can include but are not limited to pancreatitis, pseudotumor cerebri, bony changes, clot formation/stroke/heart attack.  Patient understands that alcohol is contraindicated since it can result in liver toxicity and significantly prolong the elimination of the drug by many years.
Detail Level: Simple
Siliq Counseling:  I discussed with the patient the risks of Siliq including but not limited to new or worsening depression, suicidal thoughts and behavior, immunosuppression, malignancy, posterior leukoencephalopathy syndrome, and serious infections.  The patient understands that monitoring is required including a PPD at baseline and must alert us or the primary physician if symptoms of infection or other concerning signs are noted. There is also a special program designed to monitor depression which is required with Siliq.
Topical Clindamycin Pregnancy And Lactation Text: This medication is Pregnancy Category B and is considered safe during pregnancy. It is unknown if it is excreted in breast milk.
Gabapentin Pregnancy And Lactation Text: This medication is Pregnancy Category C and isn't considered safe during pregnancy. It is excreted in breast milk.
Doxepin Pregnancy And Lactation Text: This medication is Pregnancy Category C and it isn't known if it is safe during pregnancy. It is also excreted in breast milk and breast feeding isn't recommended.
Cyclophosphamide Pregnancy And Lactation Text: This medication is Pregnancy Category D and it isn't considered safe during pregnancy. This medication is excreted in breast milk.
Rifampin Counseling: I discussed with the patient the risks of rifampin including but not limited to liver damage, kidney damage, red-orange body fluids, nausea/vomiting and severe allergy.
Benzoyl Peroxide Pregnancy And Lactation Text: This medication is Pregnancy Category C. It is unknown if benzoyl peroxide is excreted in breast milk.
Xelfredrickz Pregnancy And Lactation Text: This medication is Pregnancy Category D and is not considered safe during pregnancy.  The risk during breast feeding is also uncertain.
Humira Counseling:  I discussed with the patient the risks of adalimumab including but not limited to myelosuppression, immunosuppression, autoimmune hepatitis, demyelinating diseases, lymphoma, and serious infections.  The patient understands that monitoring is required including a PPD at baseline and must alert us or the primary physician if symptoms of infection or other concerning signs are noted.
Doxycycline Pregnancy And Lactation Text: This medication is Pregnancy Category D and not consider safe during pregnancy. It is also excreted in breast milk but is considered safe for shorter treatment courses.
Topical Sulfur Applications Pregnancy And Lactation Text: This medication is Pregnancy Category C and has an unknown safety profile during pregnancy. It is unknown if this topical medication is excreted in breast milk.
Acitretin Pregnancy And Lactation Text: This medication is Pregnancy Category X and should not be given to women who are pregnant or may become pregnant in the future. This medication is excreted in breast milk.
Topical Sulfur Applications Counseling: Topical Sulfur Counseling: Patient counseled that this medication may cause skin irritation or allergic reactions.  In the event of skin irritation, the patient was advised to reduce the amount of the drug applied or use it less frequently.   The patient verbalized understanding of the proper use and possible adverse effects of topical sulfur application.  All of the patient's questions and concerns were addressed.
Xeljanz Counseling: I discussed with the patient the risks of Xeljanz therapy including increased risk of infection, liver issues, headache, diarrhea, or cold symptoms. Live vaccines should be avoided. They were instructed to call if they have any problems.
Eucrisa Counseling: Patient may experience a mild burning sensation during topical application. Eucrisa is not approved in children less than 2 years of age.
Tazorac Counseling:  Patient advised that medication is irritating and drying.  Patient may need to apply sparingly and wash off after an hour before eventually leaving it on overnight.  The patient verbalized understanding of the proper use and possible adverse effects of tazorac.  All of the patient's questions and concerns were addressed.
Glycopyrrolate Counseling:  I discussed with the patient the risks of glycopyrrolate including but not limited to skin rash, drowsiness, dry mouth, difficulty urinating, and blurred vision.
Enbrel Pregnancy And Lactation Text: This medication is Pregnancy Category B and is considered safe during pregnancy. It is unknown if this medication is excreted in breast milk.
Itraconazole Counseling:  I discussed with the patient the risks of itraconazole including but not limited to liver damage, nausea/vomiting, neuropathy, and severe allergy.  The patient understands that this medication is best absorbed when taken with acidic beverages such as non-diet cola or ginger ale.  The patient understands that monitoring is required including baseline LFTs and repeat LFTs at intervals.  The patient understands that they are to contact us or the primary physician if concerning signs are noted.
Benzoyl Peroxide Counseling: Patient counseled that medicine may cause skin irritation and bleach clothing.  In the event of skin irritation, the patient was advised to reduce the amount of the drug applied or use it less frequently.   The patient verbalized understanding of the proper use and possible adverse effects of benzoyl peroxide.  All of the patient's questions and concerns were addressed.
Otezla Counseling: The side effects of Otezla were discussed with the patient, including but not limited to worsening or new depression, weight loss, diarrhea, nausea, upper respiratory tract infection, and headache. Patient instructed to call the office should any adverse effect occur.  The patient verbalized understanding of the proper use and possible adverse effects of Otezla.  All the patient's questions and concerns were addressed.
Sski Pregnancy And Lactation Text: This medication is Pregnancy Category D and isn't considered safe during pregnancy. It is excreted in breast milk.
Carac Counseling:  I discussed with the patient the risks of Carac including but not limited to erythema, scaling, itching, weeping, crusting, and pain.
Erythromycin Counseling:  I discussed with the patient the risks of erythromycin including but not limited to GI upset, allergic reaction, drug rash, diarrhea, increase in liver enzymes, and yeast infections.
Wartpeel Counseling:  I discussed with the patient the risks of Wartpeel including but not limited to erythema, scaling, itching, weeping, crusting, and pain.
Bexarotene Pregnancy And Lactation Text: This medication is Pregnancy Category X and should not be given to women who are pregnant or may become pregnant. This medication should not be used if you are breast feeding.
Protopic Pregnancy And Lactation Text: This medication is Pregnancy Category C. It is unknown if this medication is excreted in breast milk when applied topically.
Hydroxyzine Counseling: Patient advised that the medication is sedating and not to drive a car after taking this medication.  Patient informed of potential adverse effects including but not limited to dry mouth, urinary retention, and blurry vision.  The patient verbalized understanding of the proper use and possible adverse effects of hydroxyzine.  All of the patient's questions and concerns were addressed.
Hydroxychloroquine Counseling:  I discussed with the patient that a baseline ophthalmologic exam is needed at the start of therapy and every year thereafter while on therapy. A CBC may also be warranted for monitoring.  The side effects of this medication were discussed with the patient, including but not limited to agranulocytosis, aplastic anemia, seizures, rashes, retinopathy, and liver toxicity. Patient instructed to call the office should any adverse effect occur.  The patient verbalized understanding of the proper use and possible adverse effects of Plaquenil.  All the patient's questions and concerns were addressed.
Tazorac Pregnancy And Lactation Text: This medication is not safe during pregnancy. It is unknown if this medication is excreted in breast milk.
Cyclosporine Counseling:  I discussed with the patient the risks of cyclosporine including but not limited to hypertension, gingival hyperplasia,myelosuppression, immunosuppression, liver damage, kidney damage, neurotoxicity, lymphoma, and serious infections. The patient understands that monitoring is required including baseline blood pressure, CBC, CMP, lipid panel and uric acid, and then 1-2 times monthly CMP and blood pressure.
Cimzia Counseling:  I discussed with the patient the risks of Cimzia including but not limited to immunosuppression, allergic reactions and infections.  The patient understands that monitoring is required including a PPD at baseline and must alert us or the primary physician if symptoms of infection or other concerning signs are noted.
Xolair Counseling:  Patient informed of potential adverse effects including but not limited to fever, muscle aches, rash and allergic reactions.  The patient verbalized understanding of the proper use and possible adverse effects of Xolair.  All of the patient's questions and concerns were addressed.
Thalidomide Counseling: I discussed with the patient the risks of thalidomide including but not limited to birth defects, anxiety, weakness, chest pain, dizziness, cough and severe allergy.
Glycopyrrolate Pregnancy And Lactation Text: This medication is Pregnancy Category B and is considered safe during pregnancy. It is unknown if it is excreted breast milk.
Simponi Counseling:  I discussed with the patient the risks of golimumab including but not limited to myelosuppression, immunosuppression, autoimmune hepatitis, demyelinating diseases, lymphoma, and serious infections.  The patient understands that monitoring is required including a PPD at baseline and must alert us or the primary physician if symptoms of infection or other concerning signs are noted.
Colchicine Counseling:  Patient counseled regarding adverse effects including but not limited to stomach upset (nausea, vomiting, stomach pain, or diarrhea).  Patient instructed to limit alcohol consumption while taking this medication.  Colchicine may reduce blood counts especially with prolonged use.  The patient understands that monitoring of kidney function and blood counts may be required, especially at baseline. The patient verbalized understanding of the proper use and possible adverse effects of colchicine.  All of the patient's questions and concerns were addressed.
Include Pregnancy/Lactation Warning?: No
Otezla Pregnancy And Lactation Text: This medication is Pregnancy Category C and it isn't known if it is safe during pregnancy. It is unknown if it is excreted in breast milk.
Rifampin Pregnancy And Lactation Text: This medication is Pregnancy Category C and it isn't know if it is safe during pregnancy. It is also excreted in breast milk and should not be used if you are breast feeding.
Bexarotene Counseling:  I discussed with the patient the risks of bexarotene including but not limited to hair loss, dry lips/skin/eyes, liver abnormalities, hyperlipidemia, pancreatitis, depression/suicidal ideation, photosensitivity, drug rash/allergic reactions, hypothyroidism, anemia, leukopenia, infection, cataracts, and teratogenicity.  Patient understands that they will need regular blood tests to check lipid profile, liver function tests, white blood cell count, thyroid function tests and pregnancy test if applicable.
Protopic Counseling: Patient may experience a mild burning sensation during topical application. Protopic is not approved in children less than 2 years of age. There have been case reports of hematologic and skin malignancies in patients using topical calcineurin inhibitors although causality is questionable.
Isotretinoin Counseling: Patient should get monthly blood tests, not donate blood, not drive at night if vision affected, not share medication, and not undergo elective surgery for 6 months after tx completed. Side effects reviewed, pt to contact office should one occur.
Wartpeel Pregnancy And Lactation Text: This medication is Pregnancy Category X and contraindicated in pregnancy and in women who may become pregnant. It is unknown if this medication is excreted in breast milk.
Cimzia Pregnancy And Lactation Text: This medication crosses the placenta but can be considered safe in certain situations. Cimzia may be excreted in breast milk.
Xolair Pregnancy And Lactation Text: This medication is Pregnancy Category B and is considered safe during pregnancy. This medication is excreted in breast milk.
Hydroxychloroquine Pregnancy And Lactation Text: This medication has been shown to cause fetal harm but it isn't assigned a Pregnancy Risk Category. There are small amounts excreted in breast milk.
Ilumya Counseling: I discussed with the patient the risks of tildrakizumab including but not limited to immunosuppression, malignancy, posterior leukoencephalopathy syndrome, and serious infections.  The patient understands that monitoring is required including a PPD at baseline and must alert us or the primary physician if symptoms of infection or other concerning signs are noted.
Dapsone Counseling: I discussed with the patient the risks of dapsone including but not limited to hemolytic anemia, agranulocytosis, rashes, methemoglobinemia, kidney failure, peripheral neuropathy, headaches, GI upset, and liver toxicity.  Patients who start dapsone require monitoring including baseline LFTs and weekly CBCs for the first month, then every month thereafter.  The patient verbalized understanding of the proper use and possible adverse effects of dapsone.  All of the patient's questions and concerns were addressed.
Ketoconazole Pregnancy And Lactation Text: This medication is Pregnancy Category C and it isn't know if it is safe during pregnancy. It is also excreted in breast milk and breast feeding isn't recommended.
Solaraze Counseling:  I discussed with the patient the risks of Solaraze including but not limited to erythema, scaling, itching, weeping, crusting, and pain.
Valtrex Counseling: I discussed with the patient the risks of valacyclovir including but not limited to kidney damage, nausea, vomiting and severe allergy.  The patient understands that if the infection seems to be worsening or is not improving, they are to call.
Stelara Counseling:  I discussed with the patient the risks of ustekinumab including but not limited to immunosuppression, malignancy, posterior leukoencephalopathy syndrome, and serious infections.  The patient understands that monitoring is required including a PPD at baseline and must alert us or the primary physician if symptoms of infection or other concerning signs are noted.
Topical Clindamycin Counseling: Patient counseled that this medication may cause skin irritation or allergic reactions.  In the event of skin irritation, the patient was advised to reduce the amount of the drug applied or use it less frequently.   The patient verbalized understanding of the proper use and possible adverse effects of clindamycin.  All of the patient's questions and concerns were addressed.
Ketoconazole Counseling:   Patient counseled regarding improving absorption with orange juice.  Adverse effects include but are not limited to breast enlargement, headache, diarrhea, nausea, upset stomach, liver function test abnormalities, taste disturbance, and stomach pain.  There is a rare possibility of liver failure that can occur when taking ketoconazole. The patient understands that monitoring of LFTs may be required, especially at baseline. The patient verbalized understanding of the proper use and possible adverse effects of ketoconazole.  All of the patient's questions and concerns were addressed.
Hydroquinone Counseling:  Patient advised that medication may result in skin irritation, lightening (hypopigmentation), dryness, and burning.  In the event of skin irritation, the patient was advised to reduce the amount of the drug applied or use it less frequently.  Rarely, spots that are treated with hydroquinone can become darker (pseudoochronosis).  Should this occur, patient instructed to stop medication and call the office. The patient verbalized understanding of the proper use and possible adverse effects of hydroquinone.  All of the patient's questions and concerns were addressed.
Oxybutynin Counseling:  I discussed with the patient the risks of oxybutynin including but not limited to skin rash, drowsiness, dry mouth, difficulty urinating, and blurred vision.
Erythromycin Pregnancy And Lactation Text: This medication is Pregnancy Category B and is considered safe during pregnancy. It is also excreted in breast milk.
Hydroxyzine Pregnancy And Lactation Text: This medication is not safe during pregnancy and should not be taken. It is also excreted in breast milk and breast feeding isn't recommended.
Tetracycline Counseling: Patient counseled regarding possible photosensitivity and increased risk for sunburn.  Patient instructed to avoid sunlight, if possible.  When exposed to sunlight, patients should wear protective clothing, sunglasses, and sunscreen.  The patient was instructed to call the office immediately if the following severe adverse effects occur:  hearing changes, easy bruising/bleeding, severe headache, or vision changes.  The patient verbalized understanding of the proper use and possible adverse effects of tetracycline.  All of the patient's questions and concerns were addressed. Patient understands to avoid pregnancy while on therapy due to potential birth defects.
Valtrex Pregnancy And Lactation Text: this medication is Pregnancy Category B and is considered safe during pregnancy. This medication is not directly found in breast milk but it's metabolite acyclovir is present.
Niacinamide Counseling: I recommended taking niacin or niacinamide, also know as vitamin B3, twice daily. Recent evidence suggests that taking vitamin B3 (500 mg twice daily) can reduce the risk of actinic keratoses and non-melanoma skin cancers. Side effects of vitamin B3 include flushing and headache.
Solaraze Pregnancy And Lactation Text: This medication is Pregnancy Category B and is considered safe. There is some data to suggest avoiding during the third trimester. It is unknown if this medication is excreted in breast milk.
Birth Control Pills Counseling: Birth Control Pill Counseling: I discussed with the patient the potential side effects of OCPs including but not limited to increased risk of stroke, heart attack, thrombophlebitis, deep venous thrombosis, hepatic adenomas, breast changes, GI upset, headaches, and depression.  The patient verbalized understanding of the proper use and possible adverse effects of OCPs. All of the patient's questions and concerns were addressed.
Isotretinoin Pregnancy And Lactation Text: This medication is Pregnancy Category X and is considered extremely dangerous during pregnancy. It is unknown if it is excreted in breast milk.
Terbinafine Counseling: Patient counseling regarding adverse effects of terbinafine including but not limited to headache, diarrhea, rash, upset stomach, liver function test abnormalities, itching, taste/smell disturbance, nausea, abdominal pain, and flatulence.  There is a rare possibility of liver failure that can occur when taking terbinafine.  The patient understands that a baseline LFT and kidney function test may be required. The patient verbalized understanding of the proper use and possible adverse effects of terbinafine.  All of the patient's questions and concerns were addressed.
Bactrim Pregnancy And Lactation Text: This medication is Pregnancy Category D and is known to cause fetal risk.  It is also excreted in breast milk.
Metronidazole Counseling:  I discussed with the patient the risks of metronidazole including but not limited to seizures, nausea/vomiting, a metallic taste in the mouth, nausea/vomiting and severe allergy.
Methotrexate Counseling:  Patient counseled regarding adverse effects of methotrexate including but not limited to nausea, vomiting, abnormalities in liver function tests. Patients may develop mouth sores, rash, diarrhea, and abnormalities in blood counts. The patient understands that monitoring is required including LFT's and blood counts.  There is a rare possibility of scarring of the liver and lung problems that can occur when taking methotrexate. Persistent nausea, loss of appetite, pale stools, dark urine, cough, and shortness of breath should be reported immediately. Patient advised to discontinue methotrexate treatment at least three months before attempting to become pregnant.  I discussed the need for folate supplements while taking methotrexate.  These supplements can decrease side effects during methotrexate treatment. The patient verbalized understanding of the proper use and possible adverse effects of methotrexate.  All of the patient's questions and concerns were addressed.
5-Fu Counseling: 5-Fluorouracil Counseling:  I discussed with the patient the risks of 5-fluorouracil including but not limited to erythema, scaling, itching, weeping, crusting, and pain.
Cosentyx Counseling:  I discussed with the patient the risks of Cosentyx including but not limited to worsening of Crohn's disease, immunosuppression, allergic reactions and infections.  The patient understands that monitoring is required including a PPD at baseline and must alert us or the primary physician if symptoms of infection or other concerning signs are noted.
Zyclara Counseling:  I discussed with the patient the risks of imiquimod including but not limited to erythema, scaling, itching, weeping, crusting, and pain.  Patient understands that the inflammatory response to imiquimod is variable from person to person and was educated regarded proper titration schedule.  If flu-like symptoms develop, patient knows to discontinue the medication and contact us.
Fluconazole Counseling:  Patient counseled regarding adverse effects of fluconazole including but not limited to headache, diarrhea, nausea, upset stomach, liver function test abnormalities, taste disturbance, and stomach pain.  There is a rare possibility of liver failure that can occur when taking fluconazole.  The patient understands that monitoring of LFTs and kidney function test may be required, especially at baseline. The patient verbalized understanding of the proper use and possible adverse effects of fluconazole.  All of the patient's questions and concerns were addressed.
Cephalexin Counseling: I counseled the patient regarding use of cephalexin as an antibiotic for prophylactic and/or therapeutic purposes. Cephalexin (commonly prescribed under brand name Keflex) is a cephalosporin antibiotic which is active against numerous classes of bacteria, including most skin bacteria. Side effects may include nausea, diarrhea, gastrointestinal upset, rash, hives, yeast infections, and in rare cases, hepatitis, kidney disease, seizures, fever, confusion, neurologic symptoms, and others. Patients with severe allergies to penicillin medications are cautioned that there is about a 10% incidence of cross-reactivity with cephalosporins. When possible, patients with penicillin allergies should use alternatives to cephalosporins for antibiotic therapy.
Prednisone Counseling:  I discussed with the patient the risks of prolonged use of prednisone including but not limited to weight gain, insomnia, osteoporosis, mood changes, diabetes, susceptibility to infection, glaucoma and high blood pressure.  In cases where prednisone use is prolonged, patients should be monitored with blood pressure checks, serum glucose levels and an eye exam.  Additionally, the patient may need to be placed on GI prophylaxis, PCP prophylaxis, and calcium and vitamin D supplementation and/or a bisphosphonate.  The patient verbalized understanding of the proper use and the possible adverse effects of prednisone.  All of the patient's questions and concerns were addressed.
Minocycline Counseling: Patient advised regarding possible photosensitivity and discoloration of the teeth, skin, lips, tongue and gums.  Patient instructed to avoid sunlight, if possible.  When exposed to sunlight, patients should wear protective clothing, sunglasses, and sunscreen.  The patient was instructed to call the office immediately if the following severe adverse effects occur:  hearing changes, easy bruising/bleeding, severe headache, or vision changes.  The patient verbalized understanding of the proper use and possible adverse effects of minocycline.  All of the patient's questions and concerns were addressed.
Erivedge Counseling- I discussed with the patient the risks of Erivedge including but not limited to nausea, vomiting, diarrhea, constipation, weight loss, changes in the sense of taste, decreased appetite, muscle spasms, and hair loss.  The patient verbalized understanding of the proper use and possible adverse effects of Erivedge.  All of the patient's questions and concerns were addressed.
Azathioprine Counseling:  I discussed with the patient the risks of azathioprine including but not limited to myelosuppression, immunosuppression, hepatotoxicity, lymphoma, and infections.  The patient understands that monitoring is required including baseline LFTs, Creatinine, possible TPMP genotyping and weekly CBCs for the first month and then every 2 weeks thereafter.  The patient verbalized understanding of the proper use and possible adverse effects of azathioprine.  All of the patient's questions and concerns were addressed.
Dapsone Pregnancy And Lactation Text: This medication is Pregnancy Category C and is not considered safe during pregnancy or breast feeding.
Infliximab Counseling:  I discussed with the patient the risks of infliximab including but not limited to myelosuppression, immunosuppression, autoimmune hepatitis, demyelinating diseases, lymphoma, and serious infections.  The patient understands that monitoring is required including a PPD at baseline and must alert us or the primary physician if symptoms of infection or other concerning signs are noted.
Metronidazole Pregnancy And Lactation Text: This medication is Pregnancy Category B and considered safe during pregnancy.  It is also excreted in breast milk.
High Dose Vitamin A Counseling: Side effects reviewed, pt to contact office should one occur.
Topical Retinoid counseling:  Patient advised to apply a pea-sized amount only at bedtime and wait 30 minutes after washing their face before applying.  If too drying, patient may add a non-comedogenic moisturizer. The patient verbalized understanding of the proper use and possible adverse effects of retinoids.  All of the patient's questions and concerns were addressed.
Albendazole Counseling:  I discussed with the patient the risks of albendazole including but not limited to cytopenia, kidney damage, nausea/vomiting and severe allergy.  The patient understands that this medication is being used in an off-label manner.
Niacinamide Pregnancy And Lactation Text: These medications are considered safe during pregnancy.
Taltz Counseling: I discussed with the patient the risks of ixekizumab including but not limited to immunosuppression, serious infections, worsening of inflammatory bowel disease and drug reactions.  The patient understands that monitoring is required including a PPD at baseline and must alert us or the primary physician if symptoms of infection or other concerning signs are noted.
Imiquimod Counseling:  I discussed with the patient the risks of imiquimod including but not limited to erythema, scaling, itching, weeping, crusting, and pain.  Patient understands that the inflammatory response to imiquimod is variable from person to person and was educated regarded proper titration schedule.  If flu-like symptoms develop, patient knows to discontinue the medication and contact us.
Methotrexate Pregnancy And Lactation Text: This medication is Pregnancy Category X and is known to cause fetal harm. This medication is excreted in breast milk.

## 2020-03-12 ENCOUNTER — APPOINTMENT (RX ONLY)
Dept: URBAN - METROPOLITAN AREA CLINIC 36 | Facility: CLINIC | Age: 30
Setting detail: DERMATOLOGY
End: 2020-03-12

## 2020-03-12 DIAGNOSIS — Z41.9 ENCOUNTER FOR PROCEDURE FOR PURPOSES OTHER THAN REMEDYING HEALTH STATE, UNSPECIFIED: ICD-10-CM

## 2020-03-12 PROCEDURE — ? BOTOX

## 2020-03-12 NOTE — PROCEDURE: BOTOX
Inferior Lateral Orbicularis Oculi Units: 0
Consent: Written consent obtained. Risks include but not limited to lid/brow ptosis, bruising, swelling, diplopia, temporary effect, incomplete chemical denervation.
Additional Area 2 Location: chin
Show Depressor Anguli Units: Yes
Lot #: [id number]
Show Right And Left Brow Units: No
Expiration Date (Month Year): 05/22
Additional Area 1 Location: upper lip
Post-Care Instructions: Patient instructed to not lie down for 4 hours and limit physical activity for 24 hours. Patient instructed not to travel by airplane for 48 hours.
Dilution (U/0.1 Cc): 0.6
Additional Area 3 Location: masseter
Detail Level: Detailed

## 2023-02-21 ENCOUNTER — OFFICE VISIT (OUTPATIENT)
Dept: URGENT CARE | Facility: PHYSICIAN GROUP | Age: 33
End: 2023-02-21
Payer: COMMERCIAL

## 2023-02-21 VITALS
BODY MASS INDEX: 22.44 KG/M2 | OXYGEN SATURATION: 98 % | TEMPERATURE: 98.3 F | HEIGHT: 67 IN | HEART RATE: 97 BPM | SYSTOLIC BLOOD PRESSURE: 118 MMHG | WEIGHT: 143 LBS | RESPIRATION RATE: 16 BRPM | DIASTOLIC BLOOD PRESSURE: 78 MMHG

## 2023-02-21 DIAGNOSIS — J02.8 ACUTE BACTERIAL PHARYNGITIS: ICD-10-CM

## 2023-02-21 DIAGNOSIS — B96.89 ACUTE BACTERIAL PHARYNGITIS: ICD-10-CM

## 2023-02-21 DIAGNOSIS — Z20.818 STREPTOCOCCUS EXPOSURE: ICD-10-CM

## 2023-02-21 PROCEDURE — 99213 OFFICE O/P EST LOW 20 MIN: CPT | Performed by: NURSE PRACTITIONER

## 2023-02-21 RX ORDER — AZITHROMYCIN 500 MG/1
500 TABLET, FILM COATED ORAL DAILY
Qty: 5 TABLET | Refills: 0 | Status: SHIPPED | OUTPATIENT
Start: 2023-02-21 | End: 2023-02-26

## 2023-02-23 ASSESSMENT — ENCOUNTER SYMPTOMS
FEVER: 0
CHILLS: 0
SORE THROAT: 1
EYE DISCHARGE: 0
EYE PAIN: 0
SINUS PAIN: 1
SHORTNESS OF BREATH: 0
COUGH: 1
DIARRHEA: 0
NAUSEA: 0
VOMITING: 0
WHEEZING: 0
ABDOMINAL PAIN: 0
EYE REDNESS: 0
HEADACHES: 1

## 2023-02-23 NOTE — PROGRESS NOTES
Patient has consented to treatment and for use of patient information for treatment and billing purposes.    Chief Complaint:    Chief Complaint   Patient presents with    Pharyngitis     X 3 weeks    Body Aches     X 1 day        History of Present Illness: 32 y.o. female  presents today with on and off sore throat x3 weeks, moderate body aches, and fatigue.  She reports that she has had strep throat multiple times as her daughter is a strep carrier and feels that her strep infection has returned.        Review of Systems   Constitutional:  Negative for chills, fever and malaise/fatigue.   HENT:  Positive for congestion, sinus pain and sore throat.    Eyes:  Negative for pain, discharge and redness.   Respiratory:  Positive for cough. Negative for shortness of breath and wheezing.    Gastrointestinal:  Negative for abdominal pain, diarrhea, nausea and vomiting.   Skin:  Negative for rash.   Neurological:  Positive for headaches.       Current  Medications:  No current outpatient medications on file prior to visit.     No current facility-administered medications on file prior to visit.        Allergies:     Penicillins     Physical Exam:  Vitals:    02/21/23 1130   BP: 118/78   Pulse: 97   Resp: 16   Temp: 36.8 °C (98.3 °F)   SpO2: 98%        Physical Exam  Constitutional:       General: She is not in acute distress.     Appearance: She is ill-appearing. She is not toxic-appearing.   HENT:      Right Ear: Tympanic membrane, ear canal and external ear normal.      Left Ear: Tympanic membrane, ear canal and external ear normal.      Nose: Mucosal edema, congestion and rhinorrhea present. Rhinorrhea is clear.      Right Sinus: No maxillary sinus tenderness or frontal sinus tenderness.      Left Sinus: No maxillary sinus tenderness or frontal sinus tenderness.      Mouth/Throat:      Mouth: Mucous membranes are moist.      Pharynx: Uvula midline. Posterior oropharyngeal erythema present. No oropharyngeal exudate or  uvula swelling.   Eyes:      General:         Right eye: No discharge.         Left eye: No discharge.      Conjunctiva/sclera: Conjunctivae normal.   Cardiovascular:      Rate and Rhythm: Normal rate and regular rhythm.   Pulmonary:      Effort: Pulmonary effort is normal.      Breath sounds: Normal breath sounds.   Musculoskeletal:         General: Normal range of motion.      Cervical back: Normal range of motion.   Lymphadenopathy:      Cervical: Cervical adenopathy present.   Skin:     General: Skin is warm and dry.   Neurological:      Mental Status: She is alert and oriented to person, place, and time.            Medical Decision Making:  I personally reviewed prior external notes and test results pertinent to today's visit.   Shared decision-making was utilized with patient did develop treatment plan and clinic course. Krystal, 32 y.o. female  presents today with on and off sore throat x3 weeks, moderate body aches, and fatigue.  She reports that she has had strep throat multiple times as her daughter is a strep carrier and feels that her strep infection has returned.    Patient does have penicillin allergy however has responded well to azithromycin in the past.  Discussed with patient testing versus treating empirically due to known strep exposure and patient has declined testing today.  I do feel is appropriate to treat for strep throat as patient has known exposure.      Patient will monitor symptoms closely for worsening and is advised to seek further evaluation the emergency room if alarm signs or symptoms arise.  Patient states understanding and verbalizes agreement with this plan of care.    Plan:    Management includes completion of antibiotics, new toothbrush, soft foods, increased fluids, remain home from for 24 hours.   Management of symptoms is discussed and expected course is outlined.   Medication administration is reviewed.   To return to office if no improvement 5-7 days             1. Acute  bacterial pharyngitis  - azithromycin (ZITHROMAX) 500 MG tablet; Take 1 Tablet by mouth every day for 5 days. (Patient not taking: Reported on 2/21/2023)  Dispense: 5 Tablet; Refill: 0    2. Streptococcus exposure        Disposition:  Patient was discharged in stable condition.    Voice Recognition Disclaimer: Portions of this document were created using voice recognition software. The software does have a chance of producing errors of grammar and possibly content. I have made every reasonable attempt to correct obvious errors, but there may be errors of grammar and possibly content that I did not discover before finalizing the documentation.

## 2025-06-10 ENCOUNTER — HOSPITAL ENCOUNTER (OUTPATIENT)
Dept: LAB | Facility: MEDICAL CENTER | Age: 35
End: 2025-06-10
Attending: NURSE PRACTITIONER
Payer: COMMERCIAL

## 2025-06-10 ENCOUNTER — OFFICE VISIT (OUTPATIENT)
Dept: URGENT CARE | Facility: PHYSICIAN GROUP | Age: 35
End: 2025-06-10
Payer: COMMERCIAL

## 2025-06-10 VITALS
HEIGHT: 67 IN | TEMPERATURE: 98.1 F | WEIGHT: 161.3 LBS | BODY MASS INDEX: 25.31 KG/M2 | HEART RATE: 79 BPM | OXYGEN SATURATION: 98 % | DIASTOLIC BLOOD PRESSURE: 70 MMHG | SYSTOLIC BLOOD PRESSURE: 120 MMHG | RESPIRATION RATE: 16 BRPM

## 2025-06-10 DIAGNOSIS — Z20.818 CONTACT WITH AND (SUSPECTED) EXPOSURE TO OTHER BACTERIAL COMMUNICABLE DISEASES: Primary | ICD-10-CM

## 2025-06-10 DIAGNOSIS — Z20.818 CONTACT WITH AND (SUSPECTED) EXPOSURE TO OTHER BACTERIAL COMMUNICABLE DISEASES: ICD-10-CM

## 2025-06-10 PROCEDURE — 36415 COLL VENOUS BLD VENIPUNCTURE: CPT

## 2025-06-10 PROCEDURE — 87798 DETECT AGENT NOS DNA AMP: CPT

## 2025-06-10 PROCEDURE — 3078F DIAST BP <80 MM HG: CPT | Performed by: NURSE PRACTITIONER

## 2025-06-10 PROCEDURE — 3074F SYST BP LT 130 MM HG: CPT | Performed by: NURSE PRACTITIONER

## 2025-06-10 PROCEDURE — 99214 OFFICE O/P EST MOD 30 MIN: CPT | Performed by: NURSE PRACTITIONER

## 2025-06-10 RX ORDER — PROGESTERONE 100 MG/1
100 CAPSULE ORAL
COMMUNITY
Start: 2025-06-05

## 2025-06-10 RX ORDER — LIOTHYRONINE SODIUM 5 UG/1
10 TABLET ORAL 2 TIMES DAILY
COMMUNITY
Start: 2025-06-05

## 2025-06-10 RX ORDER — LEVOTHYROXINE SODIUM 50 UG/1
100 TABLET ORAL DAILY
COMMUNITY
Start: 2025-06-05

## 2025-06-10 NOTE — PROGRESS NOTES
"Subjective:   Nette Sinha is a 34 y.o. female who presents for Requesting Labs (Pt presents for exposure coxiella burnetti on 5/30/25. Pt would like PCR testing for Q fever. Pt is unsure if sx are sx of Q fever or allergies.)    Patient is a 34-year-old female presented clinic today reporting exposure to Coxiella burnetti (Q Fever).  Patient states on May 30, 2025 she gave mouth-to-mouth resuscitation to a stillborn lamb.  The lab was sent out for necropsy and came back positive for coxiella burnetti.  Patient states that she is asymptomatic.  She has not had any headaches, fevers, chills, cough, shortness of breath, nausea, vomiting, or sores or lesions.  Patient is requesting PCR testing as recommended by her .    Medications, Allergies, and current problem list reviewed today in Epic.     Objective:     /70 (BP Location: Left arm, Patient Position: Sitting, BP Cuff Size: Adult)   Pulse 79   Temp 36.7 °C (98.1 °F) (Temporal)   Resp 16   Ht 1.702 m (5' 7\")   Wt 73.2 kg (161 lb 4.8 oz)   SpO2 98%     Physical Exam  Vitals reviewed.   Constitutional:       General: She is not in acute distress.     Appearance: Normal appearance. She is not ill-appearing or toxic-appearing.   HENT:      Head: Normocephalic.      Nose: Nose normal.      Mouth/Throat:      Mouth: Mucous membranes are moist.   Eyes:      Extraocular Movements: Extraocular movements intact.      Conjunctiva/sclera: Conjunctivae normal.      Pupils: Pupils are equal, round, and reactive to light.   Cardiovascular:      Rate and Rhythm: Normal rate and regular rhythm.   Pulmonary:      Effort: Pulmonary effort is normal.      Breath sounds: Normal breath sounds.   Musculoskeletal:         General: Normal range of motion.      Cervical back: Normal range of motion and neck supple.   Skin:     General: Skin is warm and dry.   Neurological:      Mental Status: She is alert and oriented to person, place, and time.   Psychiatric:    "      Mood and Affect: Mood normal.         Behavior: Behavior normal.         Thought Content: Thought content normal.         Judgment: Judgment normal.       Assessment/Plan:     Diagnosis and associated orders:     1. Contact with and (suspected) exposure to other bacterial communicable diseases  MISCELLANEOUS LAB TEST ([facility]/Other)         Comments/MDM:     This is an acute condition.  Patient has exposure to Q fever.  Currently asymptomatic.  Did consult with lab and labs clinical specialists along with infectious disease.  Also reviewed up-to-date for guidelines.  PCR testing was ordered, did discuss with patient that this does go out to [facility] and could take 7 to 10 days to fully result.  Discussed with patient if she becomes symptomatic she should follow-up in the emergency department.    Patient was involved with shared decision-making throughout the exam today and verbalizes understanding regards to plan of care, discharge instructions, and follow-up    Up-to-Date  Acute Q fever is a self-limited disease, treatment is suggested for symptomatic infection. For nonpregnant patients with asymptomatic infection (ie, recent seroconversion) or resolved symptoms, we do not provide treatment. This approach is in alignment with the CDC guidelines for management of Q fever   Nonpregnant adults  Preferred regimen - Our preferred regimen for acute Q fever in nonpregnant adults without acute endocarditis is oral doxycycline (100 mg twice daily) for 14 days.    I have spent 34 minutes on the care of Nette Sinha.  This includes preparing for the urgent care visit. This time includes review of previous visits/ documents, obtaining HPI, examination and evaluation of patient, ordering and interpretation of labs, imaging, tests, medical management, counseling, education and documentation.               Differential diagnosis, natural history, supportive care, and indications for immediate follow-up  discussed.    Advised the patient to follow-up with the primary care physician for recheck, reevaluation, and consideration of further management.    I personally reviewed prior external notes and test results pertinent to today's visit as well as additional imaging and testing completed in clinic today.     Please note that this dictation was created using voice recognition software. I have made a reasonable attempt to correct obvious errors, but I expect that there are errors of grammar and possibly content that I did not discover before finalizing the note.

## 2025-06-17 LAB — TEST NAME 95000: NORMAL

## 2025-06-18 ENCOUNTER — RESULTS FOLLOW-UP (OUTPATIENT)
Dept: URGENT CARE | Facility: PHYSICIAN GROUP | Age: 35
End: 2025-06-18